# Patient Record
Sex: MALE | Race: WHITE | NOT HISPANIC OR LATINO | Employment: FULL TIME | URBAN - METROPOLITAN AREA
[De-identification: names, ages, dates, MRNs, and addresses within clinical notes are randomized per-mention and may not be internally consistent; named-entity substitution may affect disease eponyms.]

---

## 2017-12-03 ENCOUNTER — OFFICE VISIT (OUTPATIENT)
Dept: URGENT CARE | Facility: CLINIC | Age: 52
End: 2017-12-03
Payer: COMMERCIAL

## 2017-12-03 PROCEDURE — 99283 EMERGENCY DEPT VISIT LOW MDM: CPT

## 2017-12-03 PROCEDURE — G0382 LEV 3 HOSP TYPE B ED VISIT: HCPCS

## 2017-12-03 PROCEDURE — 96372 THER/PROPH/DIAG INJ SC/IM: CPT

## 2017-12-05 NOTE — PROGRESS NOTES
Assessment    1  Acute tonsillitis (463) (J03 90)   2  Dental caries (521 00) (K02 9)    Plan  Dental caries    · Amoxicillin 875 MG Oral Tablet; TAKE 1 TABLET EVERY 12 HOURS DAILY  Pain, dental    · Ketorolac Tromethamine 60 MG/2ML Injection Solution    Discussion/Summary  Discussion Summary:   Take amoxicillin as directed for the 10 day course  received Toradol 60 mg IM today in the office  chewing on the right side of her mouth as much as possible  follow up with a dentist as soon as possible  Medication Side Effects Reviewed: Possible side effects of new medications were reviewed with the patient/guardian today  Chief Complaint    1  Dental Pain  Chief Complaint Free Text Note Form: Patient relates started with right lower second from last toothache x2 days  Denies fever  C/O right ear pain  History of Present Illness  HPI: Patient presents today with complaints of sore throat ear discomfort and right mandibular tooth pain  He has a known history of a bad tooth  He states that the sore throats been going on for 2 days getting progressively worse  He is concerned because he has sleep down tomorrow for business meeting  He rates his pain an 8/10 on the pain scale  Hospital Based Practices Required Assessment:  Abuse And Domestic Violence Screen   Yes, the patient is safe at home  -- The patient states no one is hurting them  Depression And Suicide Screen  No, the patient has not had thoughts of hurting themself  No, the patient has not felt depressed in the past 7 days  Prefered Language is  english  Primary Language is  english  Review of Systems  Focused-Male:  Constitutional: no fever or chills, feels well, no tiredness, no recent weight loss or weight gain  ENT: earache-- and-- sore throat  Cardiovascular: no complaints of slow or fast heart rate, no chest pain, no palpitations, no leg claudication or lower extremity edema    Respiratory: no complaints of shortness of breath, no wheezing or cough, no dyspnea on exertion, no orthopnea or PND  Gastrointestinal: no complaints of abdominal pain, no constipation, no nausea or vomiting, no diarrhea or bloody stools  Other Symptoms: Right mandibular dental pain  Active Problems  1  Pain, dental (525 9) (K08 89)    Past Medical History  1  No pertinent past medical history  Active Problems And Past Medical History Reviewed: The active problems and past medical history were reviewed and updated today  Family History  Family History Reviewed: The family history was reviewed and updated today  Social History   · Never smoker  Social History Reviewed: The social history was reviewed and updated today  Surgical History    1  History of Appendectomy  Surgical History Reviewed: The surgical history was reviewed and updated today  Current Meds   1  Multivitamins CAPS; Therapy: (Recorded:[id number]) to Recorded  Medication List Reviewed: The medication list was reviewed and updated today  Allergies    1  No Known Drug Allergies    Vitals  Signs   Recorded: [id number] 03:18PM   Temperature: 96 1 F, Tympanic  Heart Rate: 81  Respiration: 16  Systolic: 642, RUE, Sitting  Diastolic: 93, RUE, Sitting  Height: 5 ft 10 in  Weight: 251 lb 2 oz  BMI Calculated: 36 03  BSA Calculated: 2 3  O2 Saturation: 96, RA  Pain Scale: 8    Physical Exam   Constitutional  General appearance: No acute distress, well appearing and well nourished  Eyes  Conjunctiva and lids: No swelling, erythema, or discharge  Pupils and irises: Equal, round and reactive to light  Ears, Nose, Mouth, and Throat  External inspection of ears and nose: Normal    Otoscopic examination: Tympanic membrance translucent with normal light reflex  Canals patent without erythema  Nasal mucosa, septum, and turbinates: Normal without edema or erythema  Oropharynx: Abnormal  -- tonsillar enlargement with injection  Dental simeon noted right molar area   No signs of drainage from the gum line  Pulmonary  Respiratory effort: No increased work of breathing or signs of respiratory distress  Auscultation of lungs: Clear to auscultation  Cardiovascular  Palpation of heart: Normal PMI, no thrills  Auscultation of heart: Normal rate and rhythm, normal S1 and S2, without murmurs     Examination of extremities for edema and/or varicosities: Normal        Signatures   Electronically signed by : Santiago Olivas DO; Dec  3 2017  3:30PM EST                       (Author)

## 2018-01-23 VITALS
SYSTOLIC BLOOD PRESSURE: 172 MMHG | HEIGHT: 70 IN | RESPIRATION RATE: 16 BRPM | BODY MASS INDEX: 35.95 KG/M2 | DIASTOLIC BLOOD PRESSURE: 93 MMHG | HEART RATE: 81 BPM | TEMPERATURE: 96.1 F | WEIGHT: 251.13 LBS | OXYGEN SATURATION: 96 %

## 2018-04-18 ENCOUNTER — OFFICE VISIT (OUTPATIENT)
Dept: URGENT CARE | Facility: CLINIC | Age: 53
End: 2018-04-18
Payer: COMMERCIAL

## 2018-04-18 VITALS
HEIGHT: 71 IN | HEART RATE: 101 BPM | DIASTOLIC BLOOD PRESSURE: 92 MMHG | RESPIRATION RATE: 18 BRPM | OXYGEN SATURATION: 99 % | SYSTOLIC BLOOD PRESSURE: 158 MMHG | BODY MASS INDEX: 33.88 KG/M2 | TEMPERATURE: 97 F | WEIGHT: 242 LBS

## 2018-04-18 DIAGNOSIS — L03.115 CELLULITIS OF RIGHT LOWER EXTREMITY: Primary | ICD-10-CM

## 2018-04-18 PROCEDURE — G0382 LEV 3 HOSP TYPE B ED VISIT: HCPCS | Performed by: FAMILY MEDICINE

## 2018-04-18 RX ORDER — CEPHALEXIN 500 MG/1
500 CAPSULE ORAL EVERY 8 HOURS SCHEDULED
Qty: 30 CAPSULE | Refills: 0 | Status: SHIPPED | OUTPATIENT
Start: 2018-04-18 | End: 2018-04-28

## 2018-04-18 RX ORDER — TESTOSTERONE CYPIONATE 100 MG/ML
200 INJECTION, SOLUTION INTRAMUSCULAR
COMMUNITY
End: 2018-05-14

## 2018-04-18 NOTE — PATIENT INSTRUCTIONS
Your symptoms are consistent with a cellulitis  Take Keflex as directed  Take with food  Suggest taking a probiotic while on the antibiotic  Cool compresses to the area as needed  Go to the emergency room with any worsening redness or any streaking noted  Follow with your family doctor in 3-4 days if symptoms persist or worsen

## 2018-04-18 NOTE — PROGRESS NOTES
[facility]        NAME: Lianna Araiza is a 47 y o  male  : 1965    MRN: [id number]  DATE: 2018  TIME: 6:01 PM    Assessment and Plan   Cellulitis of right lower extremity [L03 115]  1  Cellulitis of right lower extremity  cephalexin (KEFLEX) 500 mg capsule         Patient Instructions     Patient Instructions   Your symptoms are consistent with a cellulitis  Take Keflex as directed  Take with food  Suggest taking a probiotic while on the antibiotic  Cool compresses to the area as needed  Go to the emergency room with any worsening redness or any streaking noted  Follow with your family doctor in 3-4 days if symptoms persist or worsen  Follow up with PCP in 3-5 days  Proceed to  ER if symptoms worsen  Chief Complaint     Chief Complaint   Patient presents with    Cellulitis     pt dose testosterone replacement therapy; c/o injection site red swollen and tight since Monday morning         History of Present Illness       Patient presents with complaints of noticing redness and tenderness of his right thigh  He states he gives himself testosterone injections and normally uses the buttocks area  he states on  he gave an injection in his right thigh area  He noticed a little bit of redness yesterday but worse today so presents for evaluation  He complains of just pain when he touches the area  He denies noticing any red streaks  He denies any fevers  Review of Systems   Review of Systems   Constitutional: Negative  HENT: Negative for congestion, ear discharge, ear pain, hearing loss, rhinorrhea, sinus pain, sinus pressure, sore throat, tinnitus, trouble swallowing and voice change  Eyes: Negative  Respiratory: Negative  Cardiovascular: Negative  Gastrointestinal: Negative  Genitourinary: Negative  Musculoskeletal: Negative  Skin: Negative  Erythema of the right thigh   Neurological: Negative  Hematological: Negative  Current Medications       Current Outpatient Prescriptions:     testosterone cypionate (DEPO-TESTOSTERONE) 100 mg/mL IM injection, Inject 200 mg into the shoulder, thigh, or buttocks every 14 (fourteen) days, Disp: , Rfl:     cephalexin (KEFLEX) 500 mg capsule, Take 1 capsule (500 mg total) by mouth every 8 (eight) hours for 10 days, Disp: 30 capsule, Rfl: 0    Current Allergies     Allergies as of 04/18/2018    (No Known Allergies)            The following portions of the patient's history were reviewed and updated as appropriate: allergies, current medications, past family history, past medical history, past social history, past surgical history and problem list      History reviewed  No pertinent past medical history  Past Surgical History:   Procedure Laterality Date    APPENDECTOMY      PECTORALIS MAJOR REPAIR         No family history on file  Medications have been verified  Objective   /92   Pulse 101   Temp (!) 97 °F (36 1 °C) (Tympanic)   Resp 18   Ht 5' 11" (1 803 m)   Wt 110 kg (242 lb)   SpO2 99%   BMI 33 75 kg/m²        Physical Exam     Physical Exam   Constitutional: He is oriented to person, place, and time  He appears well-developed  Muscular build   HENT:   Head: Normocephalic  Right Ear: External ear normal    Left Ear: External ear normal    Mouth/Throat: Oropharynx is clear and moist    Eyes: EOM are normal  Pupils are equal, round, and reactive to light  Neck: Normal range of motion  Neck supple  No thyromegaly present  Cardiovascular: Normal rate, regular rhythm, normal heart sounds and intact distal pulses  Pulmonary/Chest: Effort normal and breath sounds normal    Abdominal: Soft  Bowel sounds are normal  There is no tenderness  Musculoskeletal: Normal range of motion  Neurological: He is alert and oriented to person, place, and time  He has normal reflexes  Skin: Skin is warm and dry  There is erythema          Psychiatric: He has a normal mood and affect  Vitals reviewed

## 2018-05-14 ENCOUNTER — OFFICE VISIT (OUTPATIENT)
Dept: URGENT CARE | Facility: CLINIC | Age: 53
End: 2018-05-14
Payer: COMMERCIAL

## 2018-05-14 VITALS
OXYGEN SATURATION: 94 % | DIASTOLIC BLOOD PRESSURE: 92 MMHG | HEIGHT: 71 IN | RESPIRATION RATE: 20 BRPM | TEMPERATURE: 100.6 F | SYSTOLIC BLOOD PRESSURE: 134 MMHG | HEART RATE: 103 BPM | WEIGHT: 242 LBS | BODY MASS INDEX: 33.88 KG/M2

## 2018-05-14 DIAGNOSIS — J06.9 VIRAL UPPER RESPIRATORY TRACT INFECTION: Primary | ICD-10-CM

## 2018-05-14 PROCEDURE — G0382 LEV 3 HOSP TYPE B ED VISIT: HCPCS | Performed by: EMERGENCY MEDICINE

## 2018-05-14 RX ORDER — METHYLPREDNISOLONE 4 MG/1
TABLET ORAL
Qty: 21 TABLET | Refills: 0 | Status: SHIPPED | OUTPATIENT
Start: 2018-05-14

## 2018-05-14 RX ORDER — ALBUTEROL SULFATE 90 UG/1
2 AEROSOL, METERED RESPIRATORY (INHALATION) EVERY 4 HOURS PRN
Qty: 1 INHALER | Refills: 0 | Status: SHIPPED | OUTPATIENT
Start: 2018-05-14

## 2018-05-14 RX ORDER — HYDROXYZINE HYDROCHLORIDE 25 MG/1
TABLET, FILM COATED ORAL
COMMUNITY
Start: 2018-02-15

## 2018-05-14 RX ORDER — AZITHROMYCIN 250 MG/1
TABLET, FILM COATED ORAL
Qty: 6 TABLET | Refills: 0 | Status: SHIPPED | OUTPATIENT
Start: 2018-05-14 | End: 2018-05-18

## 2018-05-14 RX ORDER — TESTOSTERONE CYPIONATE 200 MG/ML
INJECTION INTRAMUSCULAR
COMMUNITY
Start: 2018-04-02

## 2018-05-14 NOTE — PATIENT INSTRUCTIONS
You have been diagnosed with a Viral Upper Respiratory infection and your symptoms should resolve over the next 7 to 10 days with the treatments recommended today  If they do not, it is possible that you have developed a bacterial infection and you should begin taking the antibiotic prescribed at that time  Take an expectorant - guiafenesin should be the only ingredient - during the day, and the cough suppressant recommended at night only  Take Zinc 12 5 to 15 mg every 2 - 3 hrs while awake for the next few days  You may take Cold Eduard (13 3 mg of Zinc) or split a 25 mg Zinc tablet or lozenge in two or a 50 mg into four to get the proper dose  The total daily dose of Zinc should exceed 75 mg per day  Upper Respiratory Infection, Ambulatory Care   GENERAL INFORMATION:   An upper respiratory infection  is also called a common cold  It can affect your nose, throat, ears, and sinuses  Common symptoms include the following:   · Runny or stuffy nose    · Sneezing and coughing    · Sore throat or hoarseness    · Red, watery, and sore eyes    · Tiredness or restlessness    · Chills and fever    · Headache, body aches, or sore muscles  Seek immediate care for the following symptoms:   · Headaches or a stiff neck    · Bright lights hurt your eyes    · Chest pain or trouble breathing  Treatment for an upper respiratory infection  may include any of the following:  · Decongestants  help decrease nasal congestion and improve your breathing  Do not use decongestant sprays for more than a few days  · Cough suppressants  help decrease coughing  Ask your healthcare provider which type of cough medicine is best for you  Some cough medicines need a doctor's order  · NSAIDs  help decrease swelling and pain or fever  This medicine is available with or without a doctor's order  NSAIDs can cause stomach bleeding or kidney problems in certain people   If you take blood thinner medicine, always ask your healthcare provider if NSAIDs are safe for you  Always read the medicine label and follow directions  Care for an upper respiratory infection:   · Rest  until your fever is gone or you feel better  · Drink liquids as directed to prevent dehydration  You may need to drink 8 to 10 cups of liquid each day  Good liquids to drink include water, ginger ale, tea, or fruit juices  · Gargle  with warm salt water to help your sore throat feel better  Mix ¼ teaspoon salt with 1 cup warm water  You may also suck on hard candy or throat lozenges  · Saline nasal drops  help loosen your nasal congestion  They can be bought without a doctor's order  · Take a warm bath or shower  to help decrease body aches and help you breathe easier  · Use a cool-mist humidifier  to increase air moisture and make it easier for you to breathe  Prevent the spread of germs:   · Avoid others for the first 2 to 3 days of your cold  Germs are easily spread during this time  · Do not share food, drinks,  towels, or personal items with others  · Wash your hands often  Use soap and water  Wash your hands after you use the bathroom, change a child's diapers, or sneeze  Wash your hands before you prepare or eat food  Cover your mouth and nose with a tissue when you sneeze or cough  Follow up with your healthcare provider as directed:  Write down your questions so you remember to ask them during your visits  CARE AGREEMENT:   You have the right to help plan your care  Learn about your health condition and how it may be treated  Discuss treatment options with your caregivers to decide what care you want to receive  You always have the right to refuse treatment  The above information is an  only  It is not intended as medical advice for individual conditions or treatments  Talk to your doctor, nurse or pharmacist before following any medical regimen to see if it is safe and effective for you    © 2014 [facility]  Information is for End User's use only and may not be sold, redistributed or otherwise used for commercial purposes  All illustrations and images included in CareNotes® are the copyrighted property of A D A M [employer] Inc  or Dontrell Pyle

## 2018-05-14 NOTE — PROGRESS NOTES
[facility]        NAME: Irene Currna is a 47 y o  male  : 1965    MRN: [id number]  DATE: May 14, 2018  TIME: 9:21 AM    Assessment and Plan   No primary diagnosis found  No diagnosis found  Patient Instructions       Follow up with PCP in 3-5 days  Proceed to  ER if symptoms worsen  Chief Complaint     Chief Complaint   Patient presents with   Olsen Muscat Like Symptoms     pt states he started feeling fatigued on Friday  over the weekend he developed cough and achey  History of Present Illness       Complains of Fatigue, congestion and generalized aches for past 3 days  Review of Systems   Review of Systems   Constitutional: Negative for chills and fever  HENT: Positive for congestion, ear pain, rhinorrhea and sinus pressure  Negative for ear discharge and hearing loss  Respiratory: Positive for cough  Negative for shortness of breath and wheezing  Gastrointestinal: Negative for diarrhea and vomiting  Musculoskeletal: Negative for neck stiffness  Skin: Negative for pallor  Neurological: Negative for headaches  Current Medications       Current Outpatient Prescriptions:     hydrOXYzine HCL (ATARAX) 25 mg tablet, , Disp: , Rfl:     Multiple Vitamins-Minerals (MULTIVITAMINS PLUS ZINC) CAPS, Take by mouth, Disp: , Rfl:     testosterone cypionate (DEPO-TESTOSTERONE) 200 mg/mL SOLN, , Disp: , Rfl:     Current Allergies     Allergies as of 2018    (No Known Allergies)            The following portions of the patient's history were reviewed and updated as appropriate: allergies, current medications, past family history, past medical history, past social history, past surgical history and problem list      History reviewed  No pertinent past medical history  Past Surgical History:   Procedure Laterality Date    APPENDECTOMY      PECTORALIS MAJOR REPAIR         History reviewed  No pertinent family history        Medications have been verified  Objective   /92 (BP Location: Right arm, Patient Position: Sitting, Cuff Size: Large)   Pulse 103   Temp (!) 100 6 °F (38 1 °C) (Tympanic)   Resp 20   Ht 5' 11" (1 803 m)   Wt 110 kg (242 lb)   SpO2 94%   BMI 33 75 kg/m²        Physical Exam     Physical Exam   Constitutional: He is oriented to person, place, and time  He appears well-developed and well-nourished  No distress  HENT:   Head: Normocephalic and atraumatic  Right Ear: Tympanic membrane, external ear and ear canal normal    Left Ear: Tympanic membrane, external ear and ear canal normal    Nose: Mucosal edema and rhinorrhea present  Mouth/Throat: Posterior oropharyngeal erythema present  Eyes: Conjunctivae are normal  Pupils are equal, round, and reactive to light  Neck: Neck supple  Cardiovascular: Normal rate, regular rhythm and normal heart sounds  Pulmonary/Chest: Effort normal and breath sounds normal    Abdominal: Soft  Bowel sounds are normal    Musculoskeletal: Normal range of motion  Neurological: He is alert and oriented to person, place, and time  Skin: Skin is warm and dry  He is not diaphoretic  No pallor  Psychiatric: He has a normal mood and affect  Nursing note and vitals reviewed

## 2018-07-29 ENCOUNTER — OFFICE VISIT (OUTPATIENT)
Dept: URGENT CARE | Facility: CLINIC | Age: 53
End: 2018-07-29
Payer: COMMERCIAL

## 2018-07-29 VITALS
SYSTOLIC BLOOD PRESSURE: 168 MMHG | HEIGHT: 71 IN | DIASTOLIC BLOOD PRESSURE: 90 MMHG | OXYGEN SATURATION: 96 % | HEART RATE: 92 BPM | TEMPERATURE: 97.9 F | WEIGHT: 239 LBS | BODY MASS INDEX: 33.46 KG/M2

## 2018-07-29 DIAGNOSIS — K52.9 ENTERITIS: Primary | ICD-10-CM

## 2018-07-29 PROCEDURE — G0382 LEV 3 HOSP TYPE B ED VISIT: HCPCS | Performed by: PHYSICIAN ASSISTANT

## 2018-07-29 RX ORDER — DIPHENOXYLATE HYDROCHLORIDE AND ATROPINE SULFATE 2.5; .025 MG/1; MG/1
1 TABLET ORAL 4 TIMES DAILY PRN
Qty: 30 TABLET | Refills: 0 | Status: SHIPPED | OUTPATIENT
Start: 2018-07-29

## 2018-07-29 RX ORDER — TESTOSTERONE 10 MG/.5G
GEL, METERED TOPICAL
COMMUNITY
Start: 2013-07-25 | End: 2018-07-29 | Stop reason: SDUPTHER

## 2018-07-29 RX ORDER — TESTOSTERONE ENANTHATE 200 MG/ML
INJECTION, SOLUTION INTRAMUSCULAR
COMMUNITY
Start: 2015-11-24

## 2018-07-29 NOTE — PROGRESS NOTES
[facility]        NAME: Darnell Beach is a 47 y o  male  : 1965    MRN: [id number]  DATE: 2018  TIME: 10:02 AM    Assessment and Plan   Enteritis [K52 9]  1  Enteritis  diphenoxylate-atropine (LOMOTIL) 2 5-0 025 mg per tablet     Patient Instructions     Take medicine as prescribed  Follow up with PCP in 3-5 days  Proceed to  ER if symptoms worsen  Chief Complaint     Chief Complaint   Patient presents with    Diarrhea     3 DAYS     History of Present Illness       Diarrhea    This is a new problem  Episode onset: 2 days ago  The problem occurs 5 to 10 times per day  The problem has been unchanged  The stool consistency is described as watery  The patient states that diarrhea does not awaken him from sleep  Associated symptoms include weight loss  Pertinent negatives include no abdominal pain, arthralgias, bloating, chills, coughing, fever, headaches, increased  flatus, myalgias, sweats, URI or vomiting  Exacerbated by: eating  Risk factors include suspect food intake  Review of Systems   Review of Systems   Constitutional: Positive for weight loss  Negative for chills and fever  Respiratory: Negative for apnea, cough, choking, chest tightness, shortness of breath, wheezing and stridor  Cardiovascular: Negative for chest pain, palpitations and leg swelling  Gastrointestinal: Positive for diarrhea  Negative for abdominal distention, abdominal pain, anal bleeding, bloating, blood in stool, constipation, flatus, nausea, rectal pain and vomiting  Musculoskeletal: Negative for arthralgias and myalgias  Neurological: Negative for headaches       Current Medications       Current Outpatient Prescriptions:     Multiple Vitamins-Minerals (MULTIVITAMINS PLUS ZINC) CAPS, Take by mouth, Disp: , Rfl:     Omega-3 Fatty Acids (FISH OIL PO), Take 1 capsule by mouth, Disp: , Rfl:     testosterone cypionate (DEPO-TESTOSTERONE) 200 mg/mL SOLN, , Disp: , Rfl:     Testosterone Enanthate 200 MG/ML SOLN, , Disp: , Rfl:     albuterol (PROVENTIL HFA,VENTOLIN HFA) 90 mcg/act inhaler, Inhale 2 puffs every 4 (four) hours as needed for wheezing, Disp: 1 Inhaler, Rfl: 0    diphenoxylate-atropine (LOMOTIL) 2 5-0 025 mg per tablet, Take 1 tablet by mouth 4 (four) times a day as needed for diarrhea, Disp: 30 tablet, Rfl: 0    hydrOXYzine HCL (ATARAX) 25 mg tablet, , Disp: , Rfl:     Methylprednisolone 4 MG TBPK, Use as directed on package, Disp: 21 tablet, Rfl: 0    Current Allergies     Allergies as of 07/29/2018    (No Known Allergies)            The following portions of the patient's history were reviewed and updated as appropriate: allergies, current medications, past family history, past medical history, past social history, past surgical history and problem list      History reviewed  No pertinent past medical history  Past Surgical History:   Procedure Laterality Date    APPENDECTOMY      PECTORALIS MAJOR REPAIR         Family History   Problem Relation Age of Onset    No Known Problems Mother     No Known Problems Father          Medications have been verified  Objective   /90 (BP Location: Left arm, Patient Position: Sitting, Cuff Size: Large)   Pulse 92   Temp 97 9 °F (36 6 °C) (Tympanic)   Ht 5' 11" (1 803 m)   Wt 108 kg (239 lb)   SpO2 96%   BMI 33 33 kg/m²        Physical Exam     Physical Exam   Constitutional: He appears well-developed and well-nourished  HENT:   Head: Normocephalic  Cardiovascular: Normal rate, regular rhythm, normal heart sounds and intact distal pulses  Exam reveals no gallop and no friction rub  No murmur heard  Pulmonary/Chest: Effort normal and breath sounds normal  No respiratory distress  He has no wheezes  He has no rales  Abdominal: Soft  Bowel sounds are normal  He exhibits no distension  There is tenderness (mild,diffuse)  There is no rebound and no guarding

## 2020-12-12 ENCOUNTER — OFFICE VISIT (OUTPATIENT)
Dept: URGENT CARE | Facility: CLINIC | Age: 55
End: 2020-12-12
Payer: COMMERCIAL

## 2020-12-12 VITALS
HEART RATE: 81 BPM | BODY MASS INDEX: 33.46 KG/M2 | TEMPERATURE: 97 F | RESPIRATION RATE: 16 BRPM | WEIGHT: 239 LBS | HEIGHT: 71 IN | OXYGEN SATURATION: 97 %

## 2020-12-12 DIAGNOSIS — Z20.822 ENCOUNTER FOR LABORATORY TESTING FOR COVID-19 VIRUS: ICD-10-CM

## 2020-12-12 DIAGNOSIS — H60.331 ACUTE SWIMMER'S EAR OF RIGHT SIDE: ICD-10-CM

## 2020-12-12 DIAGNOSIS — R68.89 FLU-LIKE SYMPTOMS: Primary | ICD-10-CM

## 2020-12-12 PROCEDURE — U0003 INFECTIOUS AGENT DETECTION BY NUCLEIC ACID (DNA OR RNA); SEVERE ACUTE RESPIRATORY SYNDROME CORONAVIRUS 2 (SARS-COV-2) (CORONAVIRUS DISEASE [COVID-19]), AMPLIFIED PROBE TECHNIQUE, MAKING USE OF HIGH THROUGHPUT TECHNOLOGIES AS DESCRIBED BY CMS-2020-01-R: HCPCS | Performed by: EMERGENCY MEDICINE

## 2020-12-12 PROCEDURE — G0382 LEV 3 HOSP TYPE B ED VISIT: HCPCS | Performed by: EMERGENCY MEDICINE

## 2020-12-12 RX ORDER — NEOMYCIN SULFATE, POLYMYXIN B SULFATE AND HYDROCORTISONE 10; 3.5; 1 MG/ML; MG/ML; [USP'U]/ML
4 SUSPENSION/ DROPS AURICULAR (OTIC) EVERY 6 HOURS SCHEDULED
Qty: 10 ML | Refills: 0 | Status: SHIPPED | OUTPATIENT
Start: 2020-12-12 | End: 2020-12-19

## 2020-12-16 ENCOUNTER — TELEPHONE (OUTPATIENT)
Dept: URGENT CARE | Facility: CLINIC | Age: 55
End: 2020-12-16

## 2020-12-17 LAB — SARS-COV-2 RNA SPEC QL NAA+PROBE: NOT DETECTED

## 2020-12-20 ENCOUNTER — OFFICE VISIT (OUTPATIENT)
Dept: URGENT CARE | Facility: CLINIC | Age: 55
End: 2020-12-20
Payer: COMMERCIAL

## 2020-12-20 VITALS
WEIGHT: 235 LBS | HEIGHT: 71 IN | OXYGEN SATURATION: 99 % | BODY MASS INDEX: 32.9 KG/M2 | RESPIRATION RATE: 18 BRPM | HEART RATE: 76 BPM | TEMPERATURE: 97.7 F

## 2020-12-20 DIAGNOSIS — J01.90 ACUTE NON-RECURRENT SINUSITIS, UNSPECIFIED LOCATION: Primary | ICD-10-CM

## 2020-12-20 PROCEDURE — U0003 INFECTIOUS AGENT DETECTION BY NUCLEIC ACID (DNA OR RNA); SEVERE ACUTE RESPIRATORY SYNDROME CORONAVIRUS 2 (SARS-COV-2) (CORONAVIRUS DISEASE [COVID-19]), AMPLIFIED PROBE TECHNIQUE, MAKING USE OF HIGH THROUGHPUT TECHNOLOGIES AS DESCRIBED BY CMS-2020-01-R: HCPCS | Performed by: PHYSICIAN ASSISTANT

## 2020-12-20 PROCEDURE — 87880 STREP A ASSAY W/OPTIC: CPT | Performed by: PHYSICIAN ASSISTANT

## 2020-12-20 PROCEDURE — G0382 LEV 3 HOSP TYPE B ED VISIT: HCPCS | Performed by: PHYSICIAN ASSISTANT

## 2020-12-20 RX ORDER — PREDNISONE 10 MG/1
10 TABLET ORAL DAILY
Qty: 21 TABLET | Refills: 0 | Status: CANCELLED | OUTPATIENT
Start: 2020-12-20

## 2020-12-20 RX ORDER — AMOXICILLIN AND CLAVULANATE POTASSIUM 875; 125 MG/1; MG/1
1 TABLET, FILM COATED ORAL EVERY 12 HOURS SCHEDULED
Qty: 14 TABLET | Refills: 0 | Status: SHIPPED | OUTPATIENT
Start: 2020-12-20 | End: 2020-12-27

## 2020-12-22 LAB
S PYO AG THROAT QL: NEGATIVE
SARS-COV-2 RNA SPEC QL NAA+PROBE: DETECTED

## 2025-07-10 ENCOUNTER — OFFICE VISIT (OUTPATIENT)
Dept: URGENT CARE | Facility: CLINIC | Age: 60
End: 2025-07-10
Payer: COMMERCIAL

## 2025-07-10 VITALS
TEMPERATURE: 98.2 F | HEIGHT: 70 IN | HEART RATE: 90 BPM | BODY MASS INDEX: 28.55 KG/M2 | RESPIRATION RATE: 18 BRPM | WEIGHT: 199.4 LBS | DIASTOLIC BLOOD PRESSURE: 80 MMHG | SYSTOLIC BLOOD PRESSURE: 132 MMHG | OXYGEN SATURATION: 97 %

## 2025-07-10 DIAGNOSIS — R35.0 INCREASED URINARY FREQUENCY: ICD-10-CM

## 2025-07-10 DIAGNOSIS — R19.7 DIARRHEA, UNSPECIFIED TYPE: Primary | ICD-10-CM

## 2025-07-10 DIAGNOSIS — R25.2 LEG CRAMPING: ICD-10-CM

## 2025-07-10 LAB
SL AMB  POCT GLUCOSE, UA: ABNORMAL
SL AMB LEUKOCYTE ESTERASE,UA: ABNORMAL
SL AMB POCT BILIRUBIN,UA: ABNORMAL
SL AMB POCT BLOOD,UA: ABNORMAL
SL AMB POCT CLARITY,UA: CLEAR
SL AMB POCT COLOR,UA: ABNORMAL
SL AMB POCT KETONES,UA: ABNORMAL
SL AMB POCT NITRITE,UA: ABNORMAL
SL AMB POCT PH,UA: 5
SL AMB POCT SPECIFIC GRAVITY,UA: 1.02
SL AMB POCT URINE PROTEIN: ABNORMAL
SL AMB POCT UROBILINOGEN: NORMAL

## 2025-07-10 PROCEDURE — 87086 URINE CULTURE/COLONY COUNT: CPT

## 2025-07-10 PROCEDURE — G0383 LEV 4 HOSP TYPE B ED VISIT: HCPCS

## 2025-07-10 PROCEDURE — 81002 URINALYSIS NONAUTO W/O SCOPE: CPT

## 2025-07-10 RX ORDER — LISINOPRIL 10 MG/1
1 TABLET ORAL DAILY
COMMUNITY
Start: 2025-06-09

## 2025-07-10 RX ORDER — ROPINIROLE 2 MG/1
2 TABLET, FILM COATED ORAL
COMMUNITY
Start: 2025-07-09
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
## 2025-07-10 NOTE — PATIENT INSTRUCTIONS
Lexi tea for nausea  Avoid dairy while symptoms persist  Over the counter probiotics (4 hours before or after abx treatment if applicable)  Plenty of fluids (water and pedialyte) and rest  Broths and clear soups  Progress to BRAT Diet (bananas, rice, applesauce, and toast)  Progress to normal diet as tolerated  See PCP in 3-5 days.  Go to ED if symptoms worsen.

## 2025-07-11 LAB — BACTERIA UR CULT: NORMAL
